# Patient Record
Sex: MALE | NOT HISPANIC OR LATINO | Employment: UNEMPLOYED | URBAN - METROPOLITAN AREA
[De-identification: names, ages, dates, MRNs, and addresses within clinical notes are randomized per-mention and may not be internally consistent; named-entity substitution may affect disease eponyms.]

---

## 2020-03-26 ENCOUNTER — NURSE TRIAGE (OUTPATIENT)
Dept: OTHER | Facility: OTHER | Age: 20
End: 2020-03-26

## 2020-03-26 NOTE — TELEPHONE ENCOUNTER
Reason for Disposition   [1] Caller concerned that exposure to COVID-19 occurred BUT [2] does not meet COVID-19 EXPOSURE criteria from [facility]    Answer Assessment - Initial Assessment Questions  1  CONFIRMED CASE: "Who is the person with the confirmed COVID-19 infection that you were exposed to?"      No known exposure or travel  Works at [employer]  2  PLACE of CONTACT: "Where were you when you were exposed to COVID-19  (coronavirus disease 2019)?" (e g , city, state, country)      Unknown  3  TYPE of CONTACT: "How much contact was there?" (e g , live in same house, work in same office, same school)      Unknown  4  DATE of CONTACT: "When did you have contact with a coronavirus patient?" (e g , days)      Unknown  5  DURATION of CONTACT: "How long were you in contact with the COVID-19 (coronavirus disease) patient?" (e g , a few seconds, passed by person, a few minutes, live with the patient)      Unknown  6  SYMPTOMS: "Do you have any symptoms?" (e g , fever, cough, breathing difficulty)      2 days  Sore throat  Runny nose  SOB with activity  Not feverish  No thermometer  8  HIGH RISK: "Do you have any heart or lung problems?  Do you have a weakened immune system?" (e g , CHF, COPD, asthma, HIV positive, chemotherapy, renal failure, diabetes mellitus, sickle cell anemia)      N    Protocols used: CORONAVIRUS (COVID-19) EXPOSURE-ADULT-[place]

## 2020-03-26 NOTE — TELEPHONE ENCOUNTER
Regarding: Coronavirus  ----- Message from Isidoro Ruiz sent at 3/25/2020 11:44 PM EDT -----  "My son works at [employer] and is complaining of sob, runny nose and a sore throat "

## 2020-09-14 ENCOUNTER — OFFICE VISIT (OUTPATIENT)
Dept: URGENT CARE | Facility: CLINIC | Age: 20
End: 2020-09-14
Payer: COMMERCIAL

## 2020-09-14 VITALS
SYSTOLIC BLOOD PRESSURE: 137 MMHG | DIASTOLIC BLOOD PRESSURE: 58 MMHG | HEART RATE: 58 BPM | OXYGEN SATURATION: 97 % | TEMPERATURE: 97.1 F | RESPIRATION RATE: 16 BRPM | WEIGHT: 206 LBS | BODY MASS INDEX: 33.11 KG/M2 | HEIGHT: 66 IN

## 2020-09-14 DIAGNOSIS — Z02.5 SPORTS PHYSICAL: Primary | ICD-10-CM

## 2020-09-14 NOTE — PROGRESS NOTES
[facility]        NAME: Garlon Lesch is a 21 y o  male  : 2000    MRN: [id number]  DATE: 2020  TIME: 4:19 PM    Assessment and Plan   Sports physical [Z02 5]  1  Sports physical       Cleared for sports from a medical standpoint  Forms completed and signed  Patient Instructions     Follow up with PCP in 3-5 days  Proceed to  ER if symptoms worsen  Chief Complaint     Chief Complaint   Patient presents with    Annual Exam         History of Present Illness       60-year-old presents today for a sports physical   Attends [employer] and plans to play baseball  Reports some past sports injuries  Recently had an [employer] strain of the right elbow a year ago  Currently is asymptomatic  Review of Systems   Review of Systems   Constitutional: Negative for chills and fever  Respiratory: Negative for cough and shortness of breath  Cardiovascular: Negative for chest pain  Gastrointestinal: Negative for abdominal pain and nausea  Musculoskeletal: Negative for arthralgias, gait problem and joint swelling  Skin: Negative for rash  Neurological: Negative for dizziness, weakness and headaches  Psychiatric/Behavioral: Negative for confusion, dysphoric mood, hallucinations, self-injury and suicidal ideas  All other systems reviewed and are negative  Current Medications     No current outpatient medications on file  Current Allergies     Allergies as of 2020    (No Known Allergies)            The following portions of the patient's history were reviewed and updated as appropriate: allergies, current medications, past family history, past medical history, past social history, past surgical history and problem list      History reviewed  No pertinent past medical history      Past Surgical History:   Procedure Laterality Date    KNEE SURGERY Right     TUMOR REMOVAL      benign, removed from spine       Family History   Problem Relation Age of Onset  No Known Problems Mother     No Known Problems Father          Medications have been verified  Objective   /58   Pulse 58   Temp (!) 97 1 °F (36 2 °C)   Resp 16   Ht 5' 6" (1 676 m)   Wt 93 4 kg (206 lb)   SpO2 97%   BMI 33 25 kg/m²        Physical Exam     Physical Exam  Vitals signs and nursing note reviewed  Constitutional:       General: He is not in acute distress  Appearance: Normal appearance  He is normal weight  He is not ill-appearing, toxic-appearing or diaphoretic  HENT:      Head: Normocephalic and atraumatic  Right Ear: Tympanic membrane, ear canal and external ear normal  There is no impacted cerumen  Left Ear: Tympanic membrane, ear canal and external ear normal  There is no impacted cerumen  Nose: Nose normal  No congestion or rhinorrhea  Mouth/Throat:      Pharynx: No oropharyngeal exudate or posterior oropharyngeal erythema  Eyes:      General:         Right eye: No discharge  Left eye: No discharge  Conjunctiva/sclera: Conjunctivae normal    Cardiovascular:      Rate and Rhythm: Normal rate and regular rhythm  Pulmonary:      Effort: Pulmonary effort is normal  No respiratory distress  Breath sounds: Normal breath sounds  No stridor  No wheezing or rhonchi  Abdominal:      General: Abdomen is flat  Palpations: There is no mass  Tenderness: There is no abdominal tenderness  Hernia: No hernia is present  Genitourinary:     Penis: Normal        Scrotum/Testes: Normal    Musculoskeletal: Normal range of motion  General: No swelling, tenderness, deformity or signs of injury  Skin:     General: Skin is warm  Findings: No bruising or erythema  Neurological:      General: No focal deficit present  Mental Status: He is alert and oriented to person, place, and time  Cranial Nerves: No cranial nerve deficit  Sensory: No sensory deficit  Motor: No weakness        Coordination: Coordination normal       Gait: Gait normal       Deep Tendon Reflexes: Reflexes normal    Psychiatric:         Mood and Affect: Mood normal          Behavior: Behavior normal          Thought Content:  Thought content normal          Judgment: Judgment normal

## 2021-07-24 ENCOUNTER — IMMUNIZATIONS (OUTPATIENT)
Dept: FAMILY MEDICINE CLINIC | Facility: HOSPITAL | Age: 21
End: 2021-07-24

## 2021-07-24 DIAGNOSIS — Z23 ENCOUNTER FOR IMMUNIZATION: Primary | ICD-10-CM

## 2021-07-24 PROCEDURE — 0001A SARS-COV-2 / COVID-19 MRNA VACCINE (PFIZER-BIONTECH) 30 MCG: CPT

## 2021-07-24 PROCEDURE — 91300 SARS-COV-2 / COVID-19 MRNA VACCINE (PFIZER-BIONTECH) 30 MCG: CPT

## 2021-08-14 ENCOUNTER — IMMUNIZATIONS (OUTPATIENT)
Dept: FAMILY MEDICINE CLINIC | Facility: HOSPITAL | Age: 21
End: 2021-08-14

## 2021-08-14 DIAGNOSIS — Z23 ENCOUNTER FOR IMMUNIZATION: Primary | ICD-10-CM

## 2021-08-14 PROCEDURE — 91300 SARS-COV-2 / COVID-19 MRNA VACCINE (PFIZER-BIONTECH) 30 MCG: CPT

## 2021-08-14 PROCEDURE — 0002A SARS-COV-2 / COVID-19 MRNA VACCINE (PFIZER-BIONTECH) 30 MCG: CPT

## 2023-03-08 PROBLEM — Z00.00 ENCOUNTER FOR ANNUAL PHYSICAL EXAM: Status: ACTIVE | Noted: 2023-03-08

## 2024-03-27 DIAGNOSIS — Z00.6 ENCOUNTER FOR EXAMINATION FOR NORMAL COMPARISON OR CONTROL IN CLINICAL RESEARCH PROGRAM: ICD-10-CM
